# Patient Record
Sex: FEMALE | Race: WHITE | NOT HISPANIC OR LATINO | URBAN - NONMETROPOLITAN AREA
[De-identification: names, ages, dates, MRNs, and addresses within clinical notes are randomized per-mention and may not be internally consistent; named-entity substitution may affect disease eponyms.]

---

## 2020-12-10 ENCOUNTER — IMPORTED ENCOUNTER (OUTPATIENT)
Dept: URBAN - NONMETROPOLITAN AREA CLINIC 1 | Facility: CLINIC | Age: 85
End: 2020-12-10

## 2020-12-10 PROBLEM — H26.491: Noted: 2020-12-10

## 2020-12-10 PROBLEM — Z96.1: Noted: 2020-12-10

## 2020-12-10 PROCEDURE — 99204 OFFICE O/P NEW MOD 45 MIN: CPT

## 2020-12-10 NOTE — PATIENT DISCUSSION
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 -Explained symptoms of advancing PCO OD.-Risks  benefits and alternatives to Yag PC OD discussed with patient.-Patient would like to schedule Yag PC OD in the near future. -Monitor OS for PCO

## 2021-01-19 ENCOUNTER — IMPORTED ENCOUNTER (OUTPATIENT)
Dept: URBAN - NONMETROPOLITAN AREA CLINIC 1 | Facility: CLINIC | Age: 86
End: 2021-01-19

## 2021-01-19 PROCEDURE — 66821 AFTER CATARACT LASER SURGERY: CPT

## 2021-01-19 NOTE — PATIENT DISCUSSION
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 -Explained symptoms of advancing PCO OD.-Risks  benefits and alternatives to Yag PC OD discussed with patient.-Patient elects to proceed. Completed today without difficulty. -Monitor OS for PCO

## 2021-02-03 ENCOUNTER — IMPORTED ENCOUNTER (OUTPATIENT)
Dept: URBAN - NONMETROPOLITAN AREA CLINIC 1 | Facility: CLINIC | Age: 86
End: 2021-02-03

## 2021-02-03 PROCEDURE — 92015 DETERMINE REFRACTIVE STATE: CPT

## 2021-02-03 PROCEDURE — 99024 POSTOP FOLLOW-UP VISIT: CPT

## 2021-02-03 NOTE — PATIENT DISCUSSION
Pseudophakia OU S/P Yag PC OD-Doing well following CE IOL OU.-Widely open posterior capsule OD.-Monitor OS for PCO**************Updated spec Rx given. Recommend lens that will provide comfort as well as protect safety and health of eyes.

## 2022-04-09 ASSESSMENT — VISUAL ACUITY
OD_CC: 20/60
OS_GLARE: 20/60
OS_CC: 20/20 -2
OD_CC: 20/60 +2
OS_CC: 20/25
OD_GLARE: 20/100

## 2022-04-09 ASSESSMENT — TONOMETRY
OS_IOP_MMHG: 16
OD_IOP_MMHG: 17
OS_IOP_MMHG: 15
OD_IOP_MMHG: 16

## 2022-07-19 ENCOUNTER — ESTABLISHED PATIENT (OUTPATIENT)
Dept: RURAL CLINIC 3 | Facility: CLINIC | Age: 87
End: 2022-07-19

## 2022-07-19 DIAGNOSIS — H35.3112: ICD-10-CM

## 2022-07-19 DIAGNOSIS — H35.3121: ICD-10-CM

## 2022-07-19 PROCEDURE — 92134 CPTRZ OPH DX IMG PST SGM RTA: CPT

## 2022-07-19 PROCEDURE — 99214 OFFICE O/P EST MOD 30 MIN: CPT

## 2022-07-19 ASSESSMENT — VISUAL ACUITY
OD_CC: 20/80
OS_CC: 20/30

## 2022-07-19 ASSESSMENT — TONOMETRY
OD_IOP_MMHG: 13
OS_IOP_MMHG: 14

## 2022-08-05 ENCOUNTER — FOLLOW UP (OUTPATIENT)
Dept: RURAL CLINIC 3 | Facility: CLINIC | Age: 87
End: 2022-08-05

## 2022-08-05 DIAGNOSIS — H35.3112: ICD-10-CM

## 2022-08-05 DIAGNOSIS — H35.3121: ICD-10-CM

## 2022-08-05 PROCEDURE — 99213 OFFICE O/P EST LOW 20 MIN: CPT

## 2022-08-05 ASSESSMENT — TONOMETRY
OD_IOP_MMHG: 12
OS_IOP_MMHG: 12

## 2022-08-05 ASSESSMENT — VISUAL ACUITY
OD_SC: 20/90-
OS_SC: 20/25

## 2023-04-07 ENCOUNTER — EMERGENCY VISIT (OUTPATIENT)
Dept: RURAL CLINIC 3 | Facility: CLINIC | Age: 88
End: 2023-04-07

## 2023-04-07 DIAGNOSIS — H16.223: ICD-10-CM

## 2023-04-07 DIAGNOSIS — H02.145: ICD-10-CM

## 2023-04-07 DIAGNOSIS — H02.142: ICD-10-CM

## 2023-04-07 PROCEDURE — 99213 OFFICE O/P EST LOW 20 MIN: CPT

## 2023-04-07 ASSESSMENT — TONOMETRY
OD_IOP_MMHG: 12
OS_IOP_MMHG: 12

## 2023-04-07 ASSESSMENT — VISUAL ACUITY
OD_SC: CF 4FT
OS_SC: 20/30

## 2023-07-06 ENCOUNTER — EMERGENCY VISIT (OUTPATIENT)
Dept: RURAL CLINIC 3 | Facility: CLINIC | Age: 88
End: 2023-07-06

## 2023-07-06 DIAGNOSIS — H35.3121: ICD-10-CM

## 2023-07-06 DIAGNOSIS — H35.3112: ICD-10-CM

## 2023-07-06 PROCEDURE — 99214 OFFICE O/P EST MOD 30 MIN: CPT

## 2023-07-06 PROCEDURE — 92134 CPTRZ OPH DX IMG PST SGM RTA: CPT

## 2023-07-06 ASSESSMENT — TONOMETRY
OD_IOP_MMHG: 12
OS_IOP_MMHG: 12

## 2023-07-06 ASSESSMENT — VISUAL ACUITY
OD_CC: CF 4FT
OS_CC: 20/40

## 2023-09-05 ENCOUNTER — FOLLOW UP (OUTPATIENT)
Dept: RURAL CLINIC 3 | Facility: CLINIC | Age: 88
End: 2023-09-05

## 2023-09-05 DIAGNOSIS — H35.3112: ICD-10-CM

## 2023-09-05 DIAGNOSIS — H52.4: ICD-10-CM

## 2023-09-05 DIAGNOSIS — H35.3121: ICD-10-CM

## 2023-09-05 PROCEDURE — 99214 OFFICE O/P EST MOD 30 MIN: CPT

## 2023-09-05 PROCEDURE — 92015 DETERMINE REFRACTIVE STATE: CPT

## 2023-09-05 PROCEDURE — 92134 CPTRZ OPH DX IMG PST SGM RTA: CPT

## 2023-09-05 ASSESSMENT — VISUAL ACUITY
OD_CC: CF 3FT
OD_PH: 20/400
OS_CC: 20/40

## 2023-09-05 ASSESSMENT — TONOMETRY
OD_IOP_MMHG: 12
OS_IOP_MMHG: 12

## 2024-09-24 ENCOUNTER — FOLLOW UP (OUTPATIENT)
Dept: RURAL CLINIC 3 | Facility: CLINIC | Age: 89
End: 2024-09-24

## 2024-09-24 DIAGNOSIS — H35.3112: ICD-10-CM

## 2024-09-24 DIAGNOSIS — H16.223: ICD-10-CM

## 2024-09-24 DIAGNOSIS — H35.3121: ICD-10-CM

## 2024-09-24 PROCEDURE — 92134 CPTRZ OPH DX IMG PST SGM RTA: CPT

## 2024-09-24 PROCEDURE — 99214 OFFICE O/P EST MOD 30 MIN: CPT

## 2025-02-18 ENCOUNTER — CONTACT LENSES/GLASSES VISIT (OUTPATIENT)
Age: 89
End: 2025-02-18

## 2025-02-18 DIAGNOSIS — H52.4: ICD-10-CM

## 2025-02-18 PROCEDURE — 92015 DETERMINE REFRACTIVE STATE: CPT
